# Patient Record
Sex: FEMALE | Race: OTHER | HISPANIC OR LATINO
[De-identification: names, ages, dates, MRNs, and addresses within clinical notes are randomized per-mention and may not be internally consistent; named-entity substitution may affect disease eponyms.]

---

## 2019-11-16 ENCOUNTER — TRANSCRIPTION ENCOUNTER (OUTPATIENT)
Age: 49
End: 2019-11-16

## 2024-05-17 ENCOUNTER — APPOINTMENT (OUTPATIENT)
Dept: FAMILY MEDICINE | Facility: CLINIC | Age: 54
End: 2024-05-17
Payer: COMMERCIAL

## 2024-05-17 ENCOUNTER — NON-APPOINTMENT (OUTPATIENT)
Age: 54
End: 2024-05-17

## 2024-05-17 VITALS
SYSTOLIC BLOOD PRESSURE: 128 MMHG | HEART RATE: 72 BPM | BODY MASS INDEX: 43.71 KG/M2 | HEIGHT: 66 IN | TEMPERATURE: 98.4 F | OXYGEN SATURATION: 97 % | WEIGHT: 272 LBS | DIASTOLIC BLOOD PRESSURE: 85 MMHG

## 2024-05-17 DIAGNOSIS — Z01.818 ENCOUNTER FOR OTHER PREPROCEDURAL EXAMINATION: ICD-10-CM

## 2024-05-17 DIAGNOSIS — N84.0 POLYP OF CORPUS UTERI: ICD-10-CM

## 2024-05-17 PROCEDURE — 99203 OFFICE O/P NEW LOW 30 MIN: CPT

## 2024-05-17 RX ORDER — ESCITALOPRAM OXALATE 20 MG/1
20 TABLET, FILM COATED ORAL
Refills: 0 | Status: ACTIVE | COMMUNITY

## 2024-05-17 RX ORDER — CHROMIUM 200 MCG
TABLET ORAL
Refills: 0 | Status: ACTIVE | COMMUNITY

## 2024-05-17 RX ORDER — IBUPROFEN 600 MG/1
600 TABLET, FILM COATED ORAL
Refills: 0 | Status: ACTIVE | COMMUNITY

## 2024-05-17 RX ORDER — ETANERCEPT 50 MG/ML
50 SOLUTION SUBCUTANEOUS
Refills: 0 | Status: ACTIVE | COMMUNITY

## 2024-05-17 RX ORDER — LOSARTAN POTASSIUM 50 MG/1
50 TABLET, FILM COATED ORAL
Refills: 0 | Status: ACTIVE | COMMUNITY

## 2024-05-17 NOTE — HISTORY OF PRESENT ILLNESS
[No Pertinent Cardiac History] : no history of aortic stenosis, atrial fibrillation, coronary artery disease, recent myocardial infarction, or implantable device/pacemaker [Sleep Apnea] : sleep apnea [No Adverse Anesthesia Reaction] : no adverse anesthesia reaction in self or family member [(Patient denies any chest pain, claudication, dyspnea on exertion, orthopnea, palpitations or syncope)] : Patient denies any chest pain, claudication, dyspnea on exertion, orthopnea, palpitations or syncope [Moderate (4-6 METs)] : Moderate (4-6 METs) [Aortic Stenosis] : no aortic stenosis [Atrial Fibrillation] : no atrial fibrillation [Coronary Artery Disease] : no coronary artery disease [Recent Myocardial Infarction] : no recent myocardial infarction [Implantable Device/Pacemaker] : no implantable device/pacemaker [Asthma] : no asthma [COPD] : no COPD [Smoker] : not a smoker [Family Member] : no family member with adverse anesthesia reaction/sudden death [Self] : no previous adverse anesthesia reaction [Chronic Anticoagulation] : no chronic anticoagulation [Chronic Kidney Disease] : no chronic kidney disease [Diabetes] : no diabetes [FreeTextEntry1] : polypectomy  [FreeTextEntry2] : 5/29/24 [FreeTextEntry3] : Dr. Lester [FreeTextEntry4] : Patient is a 54 year old F with a PMHx of NAWAF on Bipap, Prediabetes, HLD, HTN, Depression/Anxiety, uterine polyps coming in for pre op clearance for polypectomy.

## 2024-05-18 ENCOUNTER — NON-APPOINTMENT (OUTPATIENT)
Age: 54
End: 2024-05-18

## 2024-05-18 LAB
ANION GAP SERPL CALC-SCNC: 11 MMOL/L
APTT BLD: 30.6 SEC
BUN SERPL-MCNC: 23 MG/DL
CALCIUM SERPL-MCNC: 9.7 MG/DL
CHLORIDE SERPL-SCNC: 105 MMOL/L
CO2 SERPL-SCNC: 26 MMOL/L
CREAT SERPL-MCNC: 0.75 MG/DL
EGFR: 95 ML/MIN/1.73M2
GLUCOSE SERPL-MCNC: 100 MG/DL
HCT VFR BLD CALC: 37.6 %
HGB BLD-MCNC: 12 G/DL
INR PPP: 1.01 RATIO
MCHC RBC-ENTMCNC: 28.3 PG
MCHC RBC-ENTMCNC: 31.9 GM/DL
MCV RBC AUTO: 88.7 FL
PLATELET # BLD AUTO: 393 K/UL
POTASSIUM SERPL-SCNC: 4 MMOL/L
PT BLD: 11.4 SEC
RBC # BLD: 4.24 M/UL
RBC # FLD: 15.5 %
SODIUM SERPL-SCNC: 142 MMOL/L
WBC # FLD AUTO: 10.55 K/UL

## 2024-05-22 ENCOUNTER — OUTPATIENT (OUTPATIENT)
Dept: OUTPATIENT SERVICES | Facility: HOSPITAL | Age: 54
LOS: 1 days | End: 2024-05-22
Payer: COMMERCIAL

## 2024-05-22 VITALS
HEIGHT: 66 IN | TEMPERATURE: 99 F | SYSTOLIC BLOOD PRESSURE: 143 MMHG | OXYGEN SATURATION: 95 % | WEIGHT: 274.92 LBS | HEART RATE: 64 BPM | DIASTOLIC BLOOD PRESSURE: 68 MMHG | RESPIRATION RATE: 14 BRPM

## 2024-05-22 DIAGNOSIS — Z98.890 OTHER SPECIFIED POSTPROCEDURAL STATES: Chronic | ICD-10-CM

## 2024-05-22 DIAGNOSIS — Z90.89 ACQUIRED ABSENCE OF OTHER ORGANS: Chronic | ICD-10-CM

## 2024-05-22 DIAGNOSIS — N84.0 POLYP OF CORPUS UTERI: ICD-10-CM

## 2024-05-22 DIAGNOSIS — Z01.818 ENCOUNTER FOR OTHER PREPROCEDURAL EXAMINATION: ICD-10-CM

## 2024-05-22 LAB — HCG UR QL: POSITIVE

## 2024-05-22 PROCEDURE — G0463: CPT

## 2024-05-22 PROCEDURE — 86900 BLOOD TYPING SEROLOGIC ABO: CPT

## 2024-05-22 PROCEDURE — 81025 URINE PREGNANCY TEST: CPT

## 2024-05-22 PROCEDURE — 86850 RBC ANTIBODY SCREEN: CPT

## 2024-05-22 PROCEDURE — 36415 COLL VENOUS BLD VENIPUNCTURE: CPT

## 2024-05-22 PROCEDURE — 86901 BLOOD TYPING SEROLOGIC RH(D): CPT

## 2024-05-22 NOTE — H&P PST ADULT - NSICDXFAMILYHX_GEN_ALL_CORE_FT
FAMILY HISTORY:  Father  Still living? No  Family history of stroke, Age at diagnosis: Age Unknown    Mother  Still living? Yes, Estimated age: 81-90  Family history of dementia, Age at diagnosis: Age Unknown

## 2024-05-22 NOTE — H&P PST ADULT - HISTORY OF PRESENT ILLNESS
54 year old female  PMH Psoriatic Arthritis, NAWAF (ON Bi-PAP), HTN, H/O Atrial Fibrillation which pt notes " resolved on it's own, no longer on medication and no longer follows with cardiology"), Anxiety, Depression, Pre-Diabetes, Overactive Bladder,  H/O Spinal Stenosis and notes Spinal Fracture L3 ( managing with physical therapy and trigger point injections- ambulating with a cane for assistance), COVID-19, recent left ankle sprain; now with Polyp Of Corpus Uteri; presents today for PST prior to Dilation & Curettage Hysteroscopy-Polypectomy With Myosure with Dr Belinda Lester on 2024.     Pt notes H/O prior Myomectomy, her LMP was 2 years ago in ; she had gone in for her annual GYN exam and underwent transvaginal Sonogram and was told she had thickened endometrial lining and Polyp. She notes she was unable to tolerate saline sonogram and endometrial biopsy in the office. Following discussions with Dr Lester regarding treatment options pt is electing for scheduled procedure.

## 2024-05-22 NOTE — H&P PST ADULT - MUSCULOSKELETAL
back pain secondary to Spinal Stenosis and FX - Ambulates with cane/decreased ROM/decreased ROM due to pain details…

## 2024-05-22 NOTE — H&P PST ADULT - LAST STRESS TEST
Notes prior H/O Nuclear Stress test at least 4 years ago - cannot remember why they did it - notes " H/O A-Fib that resolved on its own I  no longer see cardiology"

## 2024-05-22 NOTE — H&P PST ADULT - ASSESSMENT
54 year old female  PMH Psoriatic Arthritis, NAWAF (ON Bi-PAP), HTN, H/O Atrial Fibrillation which pt notes " resolved on it's own, no longer on medication and no longer follows with cardiology"), Anxiety, Depression, Pre-Diabetes, Overactive Bladder,  H/O Spinal Stenosis and notes Spinal Fracture L3 ( managing with physical therapy and trigger point injections- ambulating with a cane for assistance), COVID-19, recent left ankle sprain; now with Polyp Of Corpus Uteri; presents today for PST prior to Dilation & Curettage Hysteroscopy-Polypectomy With Myosure with Dr Belinda Lester on 2024.

## 2024-05-22 NOTE — H&P PST ADULT - NSICDXPASTMEDICALHX_GEN_ALL_CORE_FT
PAST MEDICAL HISTORY:  2019 novel coronavirus disease (COVID-19)     Anxiety and depression     H/O spinal stenosis     History of atrial fibrillation     History of psoriatic arthritis     History of spinal fracture     Hypertension     Left ankle sprain     NAWAF treated with BiPAP     Overactive bladder     Polyp of corpus uteri     Prediabetes

## 2024-05-22 NOTE — H&P PST ADULT - NSICDXPASTSURGICALHX_GEN_ALL_CORE_FT
PAST SURGICAL HISTORY:  H/O Achilles tendon repair     H/O colonoscopy     H/O endoscopy     H/O myomectomy     History of tonsillectomy

## 2024-05-22 NOTE — H&P PST ADULT - MUSCULOSKELETAL COMMENTS
Using cane for ambulation assistance notes spinal stenosis also L3 FX - uses cane for ambulation assistance - rates pain today #6/10 on pain scale

## 2024-05-22 NOTE — H&P PST ADULT - PROBLEM SELECTOR PLAN 1
PST labs;  Type & Screen, UcG ( PST labs and EKG on chart from PCP). Medical and pulmonary clearances on chart.  Pt instructed to stop any NSAIDS/Herbal Supplements between now and procedure. May take Tylenol if needed for any pain between now and procedure.   Morning of procedure she may take her Losartan with small sip of water. Pt instructed to discuss stopping Enbrel with Rheumatologist Dr Jodi Cardoso as concerned  stopping this will cause a flare of her Psoriatic Arthritis.   Pre-op instructions given to pt with understanding verbalized. All questions addressed with pt prior to her leaving the PST department today. PST labs;  Type & Screen, UcG ( PST labs and EKG on chart from PCP). Medical and pulmonary clearances on chart.  Pt instructed to stop any NSAIDS/Herbal Supplements between now and procedure.  Morning of procedure she may take her Losartan with small sip of water. Pt instructed to discuss stopping Enbrel with Rheumatologist Dr Jodi Cardoso as concerned  stopping this will cause a flare of her Psoriatic Arthritis.   Pre-op instructions given to pt with understanding verbalized. All questions addressed with pt prior to her leaving the PST department today. PST labs;  Type & Screen, UcG ( PST labs and EKG on chart from PCP). Medical and pulmonary clearances on chart.  Pt instructed to stop any NSAIDS/Herbal Supplements/Enbrel ( as per Rheumatologist) between now and procedure.  Morning of procedure she may take her Losartan with small sip of water. As per Rheumatologist Dr Jodi Cardoso pt will HOLD Enbrel between now and procedure ( last dose Wednesday 5/15/2024).   Pre-op instructions given to pt with understanding verbalized. All questions addressed with pt prior to her leaving the PST department today.

## 2024-05-29 ENCOUNTER — TRANSCRIPTION ENCOUNTER (OUTPATIENT)
Age: 54
End: 2024-05-29

## 2024-05-29 ENCOUNTER — OUTPATIENT (OUTPATIENT)
Dept: OUTPATIENT SERVICES | Facility: HOSPITAL | Age: 54
LOS: 1 days | End: 2024-05-29
Payer: COMMERCIAL

## 2024-05-29 VITALS
RESPIRATION RATE: 18 BRPM | SYSTOLIC BLOOD PRESSURE: 123 MMHG | DIASTOLIC BLOOD PRESSURE: 67 MMHG | HEART RATE: 68 BPM | TEMPERATURE: 98 F | OXYGEN SATURATION: 98 %

## 2024-05-29 VITALS
HEART RATE: 65 BPM | WEIGHT: 274.92 LBS | TEMPERATURE: 98 F | DIASTOLIC BLOOD PRESSURE: 68 MMHG | HEIGHT: 66 IN | OXYGEN SATURATION: 97 % | RESPIRATION RATE: 14 BRPM | SYSTOLIC BLOOD PRESSURE: 116 MMHG

## 2024-05-29 DIAGNOSIS — Z98.890 OTHER SPECIFIED POSTPROCEDURAL STATES: Chronic | ICD-10-CM

## 2024-05-29 DIAGNOSIS — Z90.89 ACQUIRED ABSENCE OF OTHER ORGANS: Chronic | ICD-10-CM

## 2024-05-29 DIAGNOSIS — N84.0 POLYP OF CORPUS UTERI: ICD-10-CM

## 2024-05-29 LAB
ABO RH CONFIRMATION: SIGNIFICANT CHANGE UP
HCG UR QL: NEGATIVE — SIGNIFICANT CHANGE UP

## 2024-05-29 PROCEDURE — 58558 HYSTEROSCOPY BIOPSY: CPT

## 2024-05-29 PROCEDURE — 88305 TISSUE EXAM BY PATHOLOGIST: CPT

## 2024-05-29 PROCEDURE — 81025 URINE PREGNANCY TEST: CPT

## 2024-05-29 PROCEDURE — 88305 TISSUE EXAM BY PATHOLOGIST: CPT | Mod: 26

## 2024-05-29 PROCEDURE — 36415 COLL VENOUS BLD VENIPUNCTURE: CPT

## 2024-05-29 DEVICE — MYOSURE TISSUE REMOVAL DEVICE REACH: Type: IMPLANTABLE DEVICE | Status: FUNCTIONAL

## 2024-05-29 RX ORDER — ETANERCEPT 25 MG
50 VIAL (EA) SUBCUTANEOUS
Refills: 0 | DISCHARGE

## 2024-05-29 RX ORDER — ESCITALOPRAM OXALATE 10 MG/1
1 TABLET, FILM COATED ORAL
Refills: 0 | DISCHARGE

## 2024-05-29 RX ORDER — SODIUM CHLORIDE 9 MG/ML
1000 INJECTION, SOLUTION INTRAVENOUS
Refills: 0 | Status: DISCONTINUED | OUTPATIENT
Start: 2024-05-29 | End: 2024-05-29

## 2024-05-29 RX ORDER — ONDANSETRON 8 MG/1
4 TABLET, FILM COATED ORAL ONCE
Refills: 0 | Status: DISCONTINUED | OUTPATIENT
Start: 2024-05-29 | End: 2024-05-29

## 2024-05-29 RX ORDER — LOSARTAN POTASSIUM 100 MG/1
1 TABLET, FILM COATED ORAL
Refills: 0 | DISCHARGE

## 2024-05-29 RX ORDER — EPINEPHRINE 0.3 MG/.3ML
0.3 INJECTION INTRAMUSCULAR; SUBCUTANEOUS
Refills: 0 | DISCHARGE

## 2024-05-29 RX ORDER — HYDROXYZINE HCL 10 MG
2 TABLET ORAL
Refills: 0 | DISCHARGE

## 2024-05-29 RX ORDER — HYDROMORPHONE HYDROCHLORIDE 2 MG/ML
0.5 INJECTION INTRAMUSCULAR; INTRAVENOUS; SUBCUTANEOUS ONCE
Refills: 0 | Status: DISCONTINUED | OUTPATIENT
Start: 2024-05-29 | End: 2024-05-29

## 2024-05-29 RX ADMIN — SODIUM CHLORIDE 75 MILLILITER(S): 9 INJECTION, SOLUTION INTRAVENOUS at 10:55

## 2024-05-29 RX ADMIN — SODIUM CHLORIDE 75 MILLILITER(S): 9 INJECTION, SOLUTION INTRAVENOUS at 08:04

## 2024-05-29 NOTE — ASU DISCHARGE PLAN (ADULT/PEDIATRIC) - CARE PROVIDER_API CALL
Belinda Lester  Obstetrics and Gynecology  [address] [place] [address], [address] [place] [id number]  Phone: [phone]  Fax: [phone]  Follow Up Time: 2 weeks

## 2024-05-29 NOTE — BRIEF OPERATIVE NOTE - NSICDXBRIEFPROCEDURE_GEN_ALL_CORE_FT
PROCEDURES:  Hysteroscopy, with polypectomy, cervical dilation, and curettage procedure 29-May-2024 10:26:48  Belinda Lester

## 2024-06-05 LAB — SURGICAL PATHOLOGY STUDY: SIGNIFICANT CHANGE UP

## 2025-07-31 NOTE — ASU DISCHARGE PLAN (ADULT/PEDIATRIC) - PHYSICIAN SECTION COMPLETE
Health Maintenance       HPV Vaccine (2 - Male 3-dose series)  Overdue since 8/30/2013    Hepatitis A Vaccine (2 of 2 - 2-dose series)  Overdue since 2/2/2014    COVID-19 Vaccine (3 - 2024-25 season)  Overdue since 9/1/2024    Varicella Vaccine (1 of 2 - 13+ 2-dose series)  Never done    Hepatitis B Vaccine (1 of 3 - 19+ 3-dose series)  Never done    Pneumococcal Vaccine 0-49 (1 of 2 - PCV)  Never done           Following review of the above:  Health maintenance topics up to date.    Note: Refer to final orders and clinician documentation.       Yes

## (undated) DEVICE — DRAPE LIGHT HANDLE COVER (GREEN)

## (undated) DEVICE — PREP TRAY DRY SKIN PREP SCRUB

## (undated) DEVICE — SOL INJ LR 1000ML

## (undated) DEVICE — TUBING TUR 2 PRONG

## (undated) DEVICE — PRESSURE INFUSOR BAG 1000ML

## (undated) DEVICE — TUBING SUCTION 20FT

## (undated) DEVICE — DRAPE TOWEL BLUE 17" X 24"

## (undated) DEVICE — PACK LITHOTOMY

## (undated) DEVICE — VENODYNE/SCD SLEEVE CALF MEDIUM

## (undated) DEVICE — GLV 6 PROTEXIS (WHITE)

## (undated) DEVICE — SOL IRR POUR H2O 1000ML

## (undated) DEVICE — PSP-SCD MACHINE: Type: DURABLE MEDICAL EQUIPMENT

## (undated) DEVICE — VENODYNE/SCD SLEEVE CALF LARGE

## (undated) DEVICE — GLV 6.5 PROTEXIS (BLUE)

## (undated) DEVICE — WARMING BLANKET UPPER ADULT